# Patient Record
Sex: MALE | Race: WHITE | NOT HISPANIC OR LATINO | URBAN - METROPOLITAN AREA
[De-identification: names, ages, dates, MRNs, and addresses within clinical notes are randomized per-mention and may not be internally consistent; named-entity substitution may affect disease eponyms.]

---

## 2018-05-20 ENCOUNTER — INPATIENT (INPATIENT)
Facility: HOSPITAL | Age: 23
LOS: 0 days | Discharge: HOME | DRG: 392 | End: 2018-05-21
Attending: INTERNAL MEDICINE | Admitting: FAMILY MEDICINE
Payer: COMMERCIAL

## 2018-05-20 VITALS
TEMPERATURE: 99 F | DIASTOLIC BLOOD PRESSURE: 75 MMHG | HEART RATE: 95 BPM | OXYGEN SATURATION: 98 % | RESPIRATION RATE: 15 BRPM | WEIGHT: 229.94 LBS | SYSTOLIC BLOOD PRESSURE: 113 MMHG | HEIGHT: 72 IN

## 2018-05-20 DIAGNOSIS — D72.825 BANDEMIA: ICD-10-CM

## 2018-05-20 DIAGNOSIS — R51 HEADACHE: ICD-10-CM

## 2018-05-20 DIAGNOSIS — Z29.9 ENCOUNTER FOR PROPHYLACTIC MEASURES, UNSPECIFIED: ICD-10-CM

## 2018-05-20 DIAGNOSIS — R19.7 DIARRHEA, UNSPECIFIED: ICD-10-CM

## 2018-05-20 DIAGNOSIS — K51.911 ULCERATIVE COLITIS, UNSPECIFIED WITH RECTAL BLEEDING: ICD-10-CM

## 2018-05-20 LAB
ALBUMIN SERPL ELPH-MCNC: 3.6 G/DL — SIGNIFICANT CHANGE UP (ref 3.3–5)
ALP SERPL-CCNC: 82 U/L — SIGNIFICANT CHANGE UP (ref 40–120)
ALT FLD-CCNC: 19 U/L — SIGNIFICANT CHANGE UP (ref 12–78)
ANION GAP SERPL CALC-SCNC: 9 MMOL/L — SIGNIFICANT CHANGE UP (ref 5–17)
APPEARANCE UR: CLEAR — SIGNIFICANT CHANGE UP
APTT BLD: 28.8 SEC — SIGNIFICANT CHANGE UP (ref 27.5–37.4)
AST SERPL-CCNC: 15 U/L — SIGNIFICANT CHANGE UP (ref 15–37)
BILIRUB SERPL-MCNC: 1.4 MG/DL — HIGH (ref 0.2–1.2)
BILIRUB UR-MCNC: ABNORMAL
BUN SERPL-MCNC: 9 MG/DL — SIGNIFICANT CHANGE UP (ref 7–23)
CALCIUM SERPL-MCNC: 9 MG/DL — SIGNIFICANT CHANGE UP (ref 8.5–10.1)
CHLORIDE SERPL-SCNC: 101 MMOL/L — SIGNIFICANT CHANGE UP (ref 96–108)
CO2 SERPL-SCNC: 24 MMOL/L — SIGNIFICANT CHANGE UP (ref 22–31)
COLOR SPEC: YELLOW — SIGNIFICANT CHANGE UP
CREAT SERPL-MCNC: 0.94 MG/DL — SIGNIFICANT CHANGE UP (ref 0.5–1.3)
DIFF PNL FLD: ABNORMAL
EOSINOPHIL NFR BLD AUTO: 4 % — SIGNIFICANT CHANGE UP (ref 0–6)
GLUCOSE SERPL-MCNC: 97 MG/DL — SIGNIFICANT CHANGE UP (ref 70–99)
GLUCOSE UR QL: NEGATIVE — SIGNIFICANT CHANGE UP
HCT VFR BLD CALC: 42.5 % — SIGNIFICANT CHANGE UP (ref 39–50)
HGB BLD-MCNC: 14.7 G/DL — SIGNIFICANT CHANGE UP (ref 13–17)
INR BLD: 1.4 RATIO — HIGH (ref 0.88–1.16)
KETONES UR-MCNC: ABNORMAL
LACTATE SERPL-SCNC: 1.2 MMOL/L — SIGNIFICANT CHANGE UP (ref 0.7–2)
LEUKOCYTE ESTERASE UR-ACNC: NEGATIVE — SIGNIFICANT CHANGE UP
LIDOCAIN IGE QN: 49 U/L — LOW (ref 73–393)
LYMPHOCYTES # BLD AUTO: 32 % — SIGNIFICANT CHANGE UP (ref 13–44)
MCHC RBC-ENTMCNC: 28.4 PG — SIGNIFICANT CHANGE UP (ref 27–34)
MCHC RBC-ENTMCNC: 34.5 GM/DL — SIGNIFICANT CHANGE UP (ref 32–36)
MCV RBC AUTO: 82.2 FL — SIGNIFICANT CHANGE UP (ref 80–100)
MONOCYTES NFR BLD AUTO: 25 % — HIGH (ref 1–9)
MYELOCYTES NFR BLD: 3 % — HIGH (ref 0–0)
NEUTROPHILS NFR BLD AUTO: 12 % — LOW (ref 43–77)
NEUTS BAND # BLD: 24 % — HIGH (ref 0–8)
NITRITE UR-MCNC: NEGATIVE — SIGNIFICANT CHANGE UP
PH UR: 6 — SIGNIFICANT CHANGE UP (ref 5–8)
PLAT MORPH BLD: NORMAL — SIGNIFICANT CHANGE UP
PLATELET # BLD AUTO: 184 K/UL — SIGNIFICANT CHANGE UP (ref 150–400)
POTASSIUM SERPL-MCNC: 3.9 MMOL/L — SIGNIFICANT CHANGE UP (ref 3.5–5.3)
POTASSIUM SERPL-SCNC: 3.9 MMOL/L — SIGNIFICANT CHANGE UP (ref 3.5–5.3)
PROT SERPL-MCNC: 8.2 G/DL — SIGNIFICANT CHANGE UP (ref 6–8.3)
PROT UR-MCNC: NEGATIVE — SIGNIFICANT CHANGE UP
PROTHROM AB SERPL-ACNC: 15.4 SEC — HIGH (ref 9.8–12.7)
RBC # BLD: 5.17 M/UL — SIGNIFICANT CHANGE UP (ref 4.2–5.8)
RBC # FLD: 11.2 % — SIGNIFICANT CHANGE UP (ref 10.3–14.5)
RBC BLD AUTO: SIGNIFICANT CHANGE UP
RBC CASTS # UR COMP ASSIST: ABNORMAL /HPF (ref 0–4)
SODIUM SERPL-SCNC: 134 MMOL/L — LOW (ref 135–145)
SP GR SPEC: 1.01 — SIGNIFICANT CHANGE UP (ref 1.01–1.02)
UROBILINOGEN FLD QL: NEGATIVE — SIGNIFICANT CHANGE UP
WBC # BLD: 4.6 K/UL — SIGNIFICANT CHANGE UP (ref 3.8–10.5)
WBC # FLD AUTO: 4.6 K/UL — SIGNIFICANT CHANGE UP (ref 3.8–10.5)
WBC UR QL: SIGNIFICANT CHANGE UP

## 2018-05-20 PROCEDURE — 71046 X-RAY EXAM CHEST 2 VIEWS: CPT | Mod: 26

## 2018-05-20 PROCEDURE — 99223 1ST HOSP IP/OBS HIGH 75: CPT | Mod: AI,GC

## 2018-05-20 PROCEDURE — 74177 CT ABD & PELVIS W/CONTRAST: CPT | Mod: 26

## 2018-05-20 PROCEDURE — 99285 EMERGENCY DEPT VISIT HI MDM: CPT

## 2018-05-20 RX ORDER — IOHEXOL 300 MG/ML
30 INJECTION, SOLUTION INTRAVENOUS ONCE
Qty: 0 | Refills: 0 | Status: COMPLETED | OUTPATIENT
Start: 2018-05-20 | End: 2018-05-20

## 2018-05-20 RX ORDER — PANTOPRAZOLE SODIUM 20 MG/1
40 TABLET, DELAYED RELEASE ORAL
Qty: 0 | Refills: 0 | Status: DISCONTINUED | OUTPATIENT
Start: 2018-05-20 | End: 2018-05-21

## 2018-05-20 RX ORDER — SODIUM CHLORIDE 9 MG/ML
1000 INJECTION INTRAMUSCULAR; INTRAVENOUS; SUBCUTANEOUS
Qty: 0 | Refills: 0 | Status: DISCONTINUED | OUTPATIENT
Start: 2018-05-20 | End: 2018-05-21

## 2018-05-20 RX ORDER — SODIUM CHLORIDE 9 MG/ML
3 INJECTION INTRAMUSCULAR; INTRAVENOUS; SUBCUTANEOUS ONCE
Qty: 0 | Refills: 0 | Status: COMPLETED | OUTPATIENT
Start: 2018-05-20 | End: 2018-05-20

## 2018-05-20 RX ORDER — MESALAMINE 400 MG
400 TABLET, DELAYED RELEASE (ENTERIC COATED) ORAL
Qty: 0 | Refills: 0 | COMMUNITY

## 2018-05-20 RX ORDER — ACETAMINOPHEN 500 MG
650 TABLET ORAL EVERY 6 HOURS
Qty: 0 | Refills: 0 | Status: DISCONTINUED | OUTPATIENT
Start: 2018-05-20 | End: 2018-05-21

## 2018-05-20 RX ORDER — MESALAMINE 400 MG
800 TABLET, DELAYED RELEASE (ENTERIC COATED) ORAL EVERY 8 HOURS
Qty: 0 | Refills: 0 | Status: DISCONTINUED | OUTPATIENT
Start: 2018-05-20 | End: 2018-05-21

## 2018-05-20 RX ORDER — SODIUM CHLORIDE 9 MG/ML
1000 INJECTION INTRAMUSCULAR; INTRAVENOUS; SUBCUTANEOUS ONCE
Qty: 0 | Refills: 0 | Status: COMPLETED | OUTPATIENT
Start: 2018-05-20 | End: 2018-05-20

## 2018-05-20 RX ORDER — ONDANSETRON 8 MG/1
4 TABLET, FILM COATED ORAL EVERY 6 HOURS
Qty: 0 | Refills: 0 | Status: DISCONTINUED | OUTPATIENT
Start: 2018-05-20 | End: 2018-05-21

## 2018-05-20 RX ORDER — MESALAMINE 400 MG
0 TABLET, DELAYED RELEASE (ENTERIC COATED) ORAL
Qty: 0 | Refills: 0 | COMMUNITY

## 2018-05-20 RX ORDER — HYDROCORTISONE 20 MG
100 TABLET ORAL EVERY 8 HOURS
Qty: 0 | Refills: 0 | Status: DISCONTINUED | OUTPATIENT
Start: 2018-05-20 | End: 2018-05-21

## 2018-05-20 RX ADMIN — Medication 800 MILLIGRAM(S): at 22:18

## 2018-05-20 RX ADMIN — Medication 650 MILLIGRAM(S): at 23:43

## 2018-05-20 RX ADMIN — SODIUM CHLORIDE 3 MILLILITER(S): 9 INJECTION INTRAMUSCULAR; INTRAVENOUS; SUBCUTANEOUS at 11:35

## 2018-05-20 RX ADMIN — Medication 650 MILLIGRAM(S): at 23:14

## 2018-05-20 RX ADMIN — SODIUM CHLORIDE 1000 MILLILITER(S): 9 INJECTION INTRAMUSCULAR; INTRAVENOUS; SUBCUTANEOUS at 12:15

## 2018-05-20 RX ADMIN — SODIUM CHLORIDE 1000 MILLILITER(S): 9 INJECTION INTRAMUSCULAR; INTRAVENOUS; SUBCUTANEOUS at 11:15

## 2018-05-20 RX ADMIN — IOHEXOL 30 MILLILITER(S): 300 INJECTION, SOLUTION INTRAVENOUS at 11:42

## 2018-05-20 RX ADMIN — SODIUM CHLORIDE 1000 MILLILITER(S): 9 INJECTION INTRAMUSCULAR; INTRAVENOUS; SUBCUTANEOUS at 13:15

## 2018-05-20 RX ADMIN — Medication 650 MILLIGRAM(S): at 16:00

## 2018-05-20 RX ADMIN — Medication 650 MILLIGRAM(S): at 16:30

## 2018-05-20 RX ADMIN — Medication 100 MILLIGRAM(S): at 22:18

## 2018-05-20 RX ADMIN — SODIUM CHLORIDE 75 MILLILITER(S): 9 INJECTION INTRAMUSCULAR; INTRAVENOUS; SUBCUTANEOUS at 18:16

## 2018-05-20 NOTE — H&P ADULT - PROBLEM SELECTOR PLAN 2
repeat CBC after fluid boluses  evaluate for infectious cause of colitis  monitor for fevers, tylenol PRN fever  f/u blood cultures, stool cultures, c-diff PCR repeat CBC after fluid boluses  evaluate for infectious cause of colitis  observe off antibiotics per GI recommendations  monitor for fevers, tylenol PRN fever  f/u blood cultures, stool cultures, c-diff PCR repeat CBC after fluid boluses  evaluate for infectious cause of colitis  observe off antibiotics per GI recommendations--patient is non-toxic appearing at present. Will monitor closely.   monitor for fevers, tylenol PRN fever  f/u blood cultures, stool cultures, c-diff PCR

## 2018-05-20 NOTE — H&P ADULT - NSHPSOCIALHISTORY_GEN_ALL_CORE
Student, lives at apartment in [facility]  Denies tobacco use  Admits to social EtOH use  Denies illicit drug use    IMPROVE VTE Individual Risk Assessment          RISK                                                          Points  [  ] Previous VTE                                                3  [  ] Thrombophilia                                             2  [  ] Lower limb paralysis                                   2        (unable to hold up >15 seconds)    [  ] Current Cancer                                             2         (within 6 months)  [  ] Immobilization > 24 hrs                              1  [  ] ICU/CCU stay > 24 hours                             1  [  ] Age > 60                                                         1    IMPROVE VTE Score: 0

## 2018-05-20 NOTE — ED PROVIDER NOTE - PROGRESS NOTE DETAILS
Pt doing well, although now with pos fever. some persistent pain.   Shilo Rios, will see pt , agree with admit to med.  Shilo Ohara, accepts admit.

## 2018-05-20 NOTE — H&P ADULT - ASSESSMENT
24 y/o M with PMHx of Ulcerative Colitis (dx 1.5 years ago) admitted with Ulcerative Colitis flare, evaluate for superimposed infectious colitis.

## 2018-05-20 NOTE — CONSULT NOTE ADULT - SUBJECTIVE AND OBJECTIVE BOX
Chief Complaint:  Patient is a 23y old  Male who presents with a chief complaint of bloody diarrhea    HPI:23 year old male with diagnosis of pan Ulcerative colitis on Lialda. Since this past Thursday he has had increased diarrhea with abdominal cramps and fever to 102 F . There has been no recent travel or antibiotic use. His symptoms had increased a while back and Lialda was increased to 4.8 grams. He started tapering the Lialda and then developed worsening symptoms. He had nausea and vomiting with dry heaves and a streak of blood a couple of days ago.    Allergies     No Known Allergies    Intolerances        MEDICATIONS  (STANDING):  hydrocortisone sodium succinate Injectable 100 milliGRAM(s) IV Push every 8 hours  mesalamine DR Capsule 800 milliGRAM(s) Oral every 8 hours    MEDICATIONS  (PRN):      PAST MEDICAL & SURGICAL HISTORY:  Ulcerative pancolitis  No significant past surgical history      FAMILY HISTORY: Non Contributory      Social History  Tobacco:no  Alcohol: social  Drugs: no    ROS  General:  No wt loss,  chills, night sweats, fatigue, positive fever  Eyes:  Good vision, no reported pain  ENT:  No sore throat, pain, runny nose, dysphagia  CV:  No pain, palpitations, hypo/hypertension  Resp:  No dyspnea, cough, tachypnea, wheezing  GI:  No pain, No nausea, No vomiting, No diarrhea, No constipation, No weight loss, No fever, No pruritis, No rectal bleeding, No tarry stools, No dysphagia,  :  No pain, bleeding, incontinence, nocturia  Muscle:  No pain, weakness vague joint pains  Neuro:  No weakness, tingling, memory problems headache associated with fever.  Psych:  No fatigue, insomnia, mood problems, depression  Endocrine:  No polyuria, polydipsia, cold/heat intolerance  Heme:  No petechiae, ecchymosis, easy bruisability  Skin:  No rash, tattoos, scars, edema      PHYSICAL EXAM:   Vital Signs Last 24 Hrs  T(C): 37.9 (20 May 2018 13:20), Max: 37.9 (20 May 2018 13:20)  T(F): 100.3 (20 May 2018 13:20), Max: 100.3 (20 May 2018 13:20)  HR: 95 (20 May 2018 10:39) (95 - 95)  BP: 113/75 (20 May 2018 10:39) (113/75 - 113/75)  BP(mean): --  RR: 15 (20 May 2018 10:39) (15 - 15)  SpO2: 98% (20 May 2018 10:39) (98% - 98%)  GENERAL:  Well developed, well-nourished  HEENT:  NC/AT,  conjunctivae clear and pink, no thyromegaly, nodules, adenopathy, no JVD, sclera -anicteric  CHEST: Lungs clear to P&A  ABDOMEN:  Soft, mild diffuse tenderness and plus minus rebound , non-distended, diminished bowel sounds,  no masses ,no hepato-splenomegaly, no signs of chronic liver disease  EXTEREMITIES:  no cyanosis,clubbing or edema  SKIN:  No rash/erythema/ecchymoses/petechiae/wounds/abscess/warm/dry  NEURO:  Alert, oriented  Height (cm): 182.88 ( @ 10:39)  Weight (kg): 104.3 ( @ 10:39)  BMI (kg/m2): 31.2 ( @ 10:39)  BSA (m2): 2.26 ( @ 10:39)    LABS:                        14.7   4.6   )-----------( 184      ( 20 May 2018 11:33 )             42.5     05-20    134<L>  |  101  |  9   ----------------------------<  97  3.9   |  24  |  0.94    Ca    9.0      20 May 2018 11:33    TPro  8.2  /  Alb  3.6  /  TBili  1.4<H>  /  DBili  x   /  AST  15  /  ALT  19  /  AlkPhos  82  05-20     LIVER FUNCTIONS - ( 20 May 2018 11:33 )  Alb: 3.6 g/dL / Pro: 8.2 g/dL / ALK PHOS: 82 U/L / ALT: 19 U/L / AST: 15 U/L / GGT: x           PT/INR - ( 20 May 2018 11:33 )   PT: 15.4 sec;   INR: 1.40 ratio         PTT - ( 20 May 2018 11:33 )  PTT:28.8 sec  Urinalysis Basic - ( 20 May 2018 13:47 )    Color: Yellow / Appearance: Clear / S.015 / pH: x  Gluc: x / Ketone: Small  / Bili: Small / Urobili: Negative   Blood: x / Protein: Negative / Nitrite: Negative   Leuk Esterase: Negative / RBC: 3-5 /HPF / WBC 0-2   Sq Epi: x / Non Sq Epi: x / Bacteria: x      Amylase Serum--      Lipase serum49       Ammonia--      Imaging:  < from: CT Abdomen and Pelvis w/ Oral Cont and w/ IV Cont (18 @ 14:00) >  EXAM:  CT ABDOMEN AND PELVIS OC IC                            PROCEDURE DATE:  2018          INTERPRETATION:  CT ABDOMEN AND PELVIS    CLINICAL INFORMATION:  Lower abdominal pain with some bloody diarrhea.   History of ulcerative colitis.    PROCEDURE:  Using multislice helical CT, oral contrast, and following the   intravenous administration of 95 cc Omnipaque 300 , 2.5 mm sections were   obtained from the domes of the diaphragm to the ischial tuberosities.    Multiplanar MPR's were performed.    COMPARISON: None available    FINDINGS:      The liver, spleen and gallbladder appear unremarkable.    The adrenal glands and pancreas are unremarkable in appearance.    No hydroureteronephrosis is noted.    The abdominal aorta is normal in caliber.  There are small periaortic retroperitoneal lymph nodes.    Evaluation of the stomach is limited without distention.  There is diffuse submucosal edema/bowel wall thickening involving the   cecum and ascending colon, through the hepatic flexure. Bowel wall   thickening/submucosal edema is also noted involving the terminal ileum,   which may be associated with a backwash ileitis in patient with history   of ulcerative colitis.  The appendix is normal in appearance.    There are clusters ofmesenteric and right ileocolic lymph nodes, largest   measuring up to 2 cm.    Although nondistended, there is also bowel wall thickening involving the   descending through proximal sigmoid colon.    No bowel obstruction is noted.  No localized intra-abdominal fluid collection or pneumoperitoneum is   noted.    The urinary bladder appears unremarkable.  No pelvic mass or free fluid is noted.    Impression:    Noncontiguous areas of bowel wall thickening, more pronounced in the   right colon as discussed, compatible with inflammatory colitis; and may   be associated with ulcerative colitis or Crohn's disease.  Involvement of the terminal ileum as discussed may be associated with a   backwash ileitis.    Enlarged mesenteric and right ileocolic lymph nodes, which may be   reactive.  Recommend comparison with any prior imaging studies.      IBETH SIDDIQUI M.D., ATTENDING RADIOLOGIST  This document has been electronically signed. May 20 2018  2:14PM        < end of copied text >

## 2018-05-20 NOTE — H&P ADULT - PROBLEM SELECTOR PLAN 1
admit to medicine  start solu-cortef 100mg q8 and mesalamine 800mg q8  start on clear diet, add IVF at 75cc while PO intake is limited  discussed case with GI consult Dr. Rios. Dr. Tucker to see patient tomorrow. admit to medicine  start solu-cortef 100mg q8 and mesalamine 800mg q8  start on clear diet, add IVF at 75cc while PO intake is limited; zofran PRN  discussed case with GI consult Dr. Rios. Dr. Tucker to see patient tomorrow.

## 2018-05-20 NOTE — ED ADULT TRIAGE NOTE - CHIEF COMPLAINT QUOTE
abdominal pain with bloody diarrhea for a couple of days. abdominal pain with bloody diarrhea for a couple of days.with fever

## 2018-05-20 NOTE — ED ADULT NURSE NOTE - OBJECTIVE STATEMENT
Pt presents to the ED c/o intermittent fever, left sided abd pain radiating to the middle of his abd, denies nausea, vomiting, diarrhea. Assumed care od pt from Alicia Ang in rm 13 B, c/o intermittent fever, left sided abd pain radiating to the middle of his abd, denies nausea, vomiting, diarrhea. Assumed care od pt from Alicia Ang in rm 13 B, c/o intermittent fever, left sided abd pain radiating to the middle of his abd, diarrhea, denies nausea, vomiting.

## 2018-05-20 NOTE — H&P ADULT - ATTENDING COMMENTS
Case discussed with PGY2 Dr. Hinojosa. I agree with his H&P, assessment, and plan above (edited where necessary).

## 2018-05-20 NOTE — ED PROVIDER NOTE - CHPI ED SYMPTOMS NEG
no chills/no blood in stool/no dysuria/no fever/no abdominal distension/no burning urination/no hematuria

## 2018-05-20 NOTE — H&P ADULT - HISTORY OF PRESENT ILLNESS
22 y/o M with PMHx of Ulcerative Colitis (dx 1.5 years ago) presented to the ED with a 4 day history of crampy abdominal pain and diarrhea. States that diarrhea has been going on since Thursday and is occasionally bloody. States that this feels like a flare of his UC. Pain is diffuse and comes and goes, now L>R. Patient also admits to dry heaving with some vomiting with scant blood. Also has been having fevers for last 4 days, taking 2 Advil a day. Tmax 101 today. Sees Dr. Tucker for GI, had colonoscopy one month ago and patient states that he was told that it revealed some mild inflammation. Admits to chills and headache as well.    In the ED, VSS Tmax 100.3. Labs significant for bandemia of 24%. CXR negative. CT Abdominal/Pelvis showed Noncontiguous areas of bowel wall thickening, more pronounced in the right colon as discussed, compatible with inflammatory colitis; and may be associated with ulcerative colitis or Crohn's disease. Involvement of the terminal ileum as discussed may be associated with a backwash ileitis. Enlarged mesenteric and right ileocolic lymph nodes, which may be reactive.

## 2018-05-20 NOTE — H&P ADULT - NSHPPHYSICALEXAM_GEN_ALL_CORE
Vital Signs Last 24 Hrs  T(C): 37.9 (20 May 2018 13:20), Max: 37.9 (20 May 2018 13:20)  T(F): 100.3 (20 May 2018 13:20), Max: 100.3 (20 May 2018 13:20)  HR: 95 (20 May 2018 10:39) (95 - 95)  BP: 113/75 (20 May 2018 10:39) (113/75 - 113/75)  RR: 15 (20 May 2018 10:39) (15 - 15)  SpO2: 98% (20 May 2018 10:39) (98% - 98%)    Physical Exam:  General: Well developed, well nourished, NAD  HEENT: NCAT, PERRLA, EOMI bl, moist mucous membranes   Neck: Supple, nontender, no mass  Neurology: A&Ox3, nonfocal, CN II-XII grossly intact, sensation intact  Respiratory: CTA B/L, No W/R/R  CV: RRR, +S1/S2, no murmurs, rubs or gallops  Abdominal: Soft, diffuse tenderness to palpation with some rebound L>R, non distended, normoactive bowel sounds  Extremities: No C/C/E, + peripheral pulses  MSK: Normal ROM, no joint erythema or warmth, no joint swelling   Skin: warm, dry, normal color, no rash or abnormal lesions

## 2018-05-20 NOTE — ED PROVIDER NOTE - OBJECTIVE STATEMENT
22 yo M p/w lower abd pain x past ~ 4 days. No cp, sob, palp. Some nausea, no vomiting. Some loose watery diarrhea with on off bloody diarrhea. Pt with hx UC, feels ? exacerbation. Pt sent by GI for hood / CT. No neck / back pain. no numb/ting/focal weak. No agg/allev factors. No other inj or co.

## 2018-05-21 VITALS
HEART RATE: 82 BPM | OXYGEN SATURATION: 99 % | DIASTOLIC BLOOD PRESSURE: 77 MMHG | RESPIRATION RATE: 18 BRPM | TEMPERATURE: 98 F | SYSTOLIC BLOOD PRESSURE: 129 MMHG

## 2018-05-21 LAB
ANION GAP SERPL CALC-SCNC: 9 MMOL/L — SIGNIFICANT CHANGE UP (ref 5–17)
BUN SERPL-MCNC: 8 MG/DL — SIGNIFICANT CHANGE UP (ref 7–23)
C DIFF BY PCR RESULT: SIGNIFICANT CHANGE UP
C DIFF TOX GENS STL QL NAA+PROBE: SIGNIFICANT CHANGE UP
CALCIUM SERPL-MCNC: 8.5 MG/DL — SIGNIFICANT CHANGE UP (ref 8.5–10.1)
CHLORIDE SERPL-SCNC: 102 MMOL/L — SIGNIFICANT CHANGE UP (ref 96–108)
CO2 SERPL-SCNC: 26 MMOL/L — SIGNIFICANT CHANGE UP (ref 22–31)
CREAT SERPL-MCNC: 0.75 MG/DL — SIGNIFICANT CHANGE UP (ref 0.5–1.3)
CULTURE RESULTS: NO GROWTH — SIGNIFICANT CHANGE UP
EOSINOPHIL NFR BLD AUTO: 1 % — SIGNIFICANT CHANGE UP (ref 0–6)
GLUCOSE SERPL-MCNC: 104 MG/DL — HIGH (ref 70–99)
HCT VFR BLD CALC: 35 % — LOW (ref 39–50)
HGB BLD-MCNC: 12.5 G/DL — LOW (ref 13–17)
LYMPHOCYTES # BLD AUTO: 15 % — SIGNIFICANT CHANGE UP (ref 13–44)
MCHC RBC-ENTMCNC: 29.1 PG — SIGNIFICANT CHANGE UP (ref 27–34)
MCHC RBC-ENTMCNC: 35.7 GM/DL — SIGNIFICANT CHANGE UP (ref 32–36)
MCV RBC AUTO: 81.6 FL — SIGNIFICANT CHANGE UP (ref 80–100)
MONOCYTES NFR BLD AUTO: 23 % — HIGH (ref 1–9)
NEUTROPHILS NFR BLD AUTO: 19 % — LOW (ref 43–77)
PLATELET # BLD AUTO: 163 K/UL — SIGNIFICANT CHANGE UP (ref 150–400)
POTASSIUM SERPL-MCNC: 3.8 MMOL/L — SIGNIFICANT CHANGE UP (ref 3.5–5.3)
POTASSIUM SERPL-SCNC: 3.8 MMOL/L — SIGNIFICANT CHANGE UP (ref 3.5–5.3)
RBC # BLD: 4.29 M/UL — SIGNIFICANT CHANGE UP (ref 4.2–5.8)
RBC # FLD: 11 % — SIGNIFICANT CHANGE UP (ref 10.3–14.5)
SODIUM SERPL-SCNC: 137 MMOL/L — SIGNIFICANT CHANGE UP (ref 135–145)
SPECIMEN SOURCE: SIGNIFICANT CHANGE UP
WBC # BLD: 4 K/UL — SIGNIFICANT CHANGE UP (ref 3.8–10.5)
WBC # FLD AUTO: 4 K/UL — SIGNIFICANT CHANGE UP (ref 3.8–10.5)

## 2018-05-21 PROCEDURE — 83605 ASSAY OF LACTIC ACID: CPT

## 2018-05-21 PROCEDURE — 71046 X-RAY EXAM CHEST 2 VIEWS: CPT

## 2018-05-21 PROCEDURE — 99285 EMERGENCY DEPT VISIT HI MDM: CPT | Mod: 25

## 2018-05-21 PROCEDURE — 36415 COLL VENOUS BLD VENIPUNCTURE: CPT

## 2018-05-21 PROCEDURE — 80048 BASIC METABOLIC PNL TOTAL CA: CPT

## 2018-05-21 PROCEDURE — 87045 FECES CULTURE AEROBIC BACT: CPT

## 2018-05-21 PROCEDURE — 83690 ASSAY OF LIPASE: CPT

## 2018-05-21 PROCEDURE — 80053 COMPREHEN METABOLIC PANEL: CPT

## 2018-05-21 PROCEDURE — 74177 CT ABD & PELVIS W/CONTRAST: CPT

## 2018-05-21 PROCEDURE — 85730 THROMBOPLASTIN TIME PARTIAL: CPT

## 2018-05-21 PROCEDURE — 87086 URINE CULTURE/COLONY COUNT: CPT

## 2018-05-21 PROCEDURE — 87046 STOOL CULTR AEROBIC BACT EA: CPT

## 2018-05-21 PROCEDURE — 87040 BLOOD CULTURE FOR BACTERIA: CPT

## 2018-05-21 PROCEDURE — 85610 PROTHROMBIN TIME: CPT

## 2018-05-21 PROCEDURE — 81001 URINALYSIS AUTO W/SCOPE: CPT

## 2018-05-21 PROCEDURE — 85027 COMPLETE CBC AUTOMATED: CPT

## 2018-05-21 PROCEDURE — 87493 C DIFF AMPLIFIED PROBE: CPT

## 2018-05-21 PROCEDURE — 99239 HOSP IP/OBS DSCHRG MGMT >30: CPT

## 2018-05-21 RX ADMIN — PANTOPRAZOLE SODIUM 40 MILLIGRAM(S): 20 TABLET, DELAYED RELEASE ORAL at 05:50

## 2018-05-21 RX ADMIN — Medication 100 MILLIGRAM(S): at 12:53

## 2018-05-21 RX ADMIN — Medication 800 MILLIGRAM(S): at 05:50

## 2018-05-21 RX ADMIN — Medication 100 MILLIGRAM(S): at 05:50

## 2018-05-21 RX ADMIN — Medication 800 MILLIGRAM(S): at 12:53

## 2018-05-21 NOTE — DISCHARGE NOTE ADULT - CARE PLAN
Principal Discharge DX:	Gastroenteritis, infectious  Goal:	Resolution Principal Discharge DX:	Gastroenteritis, infectious  Goal:	Resolution  Assessment and plan of treatment:	-You likely had a viral gastrointestinal infection which caused your symptoms over the last few days.  -You were seen by your Gastroenterologist Dr. Tucker, who agreed that this episode was likely not a flare of your Ulcerative Colitis.  -You were started on intravenous steroids while in the hospital and will continue taking oral steroids as above until your follow up with Dr. Tucker outpatient on Friday.  -Continue to advance your diet as much as you can tolerate.

## 2018-05-21 NOTE — DISCHARGE NOTE ADULT - PLAN OF CARE
Resolution -You likely had a viral gastrointestinal infection which caused your symptoms over the last few days.  -You were seen by your Gastroenterologist Dr. Tucker, who agreed that this episode was likely not a flare of your Ulcerative Colitis.  -You were started on intravenous steroids while in the hospital and will continue taking oral steroids as above until your follow up with Dr. Tucker outpatient on Friday.  -Continue to advance your diet as much as you can tolerate.

## 2018-05-21 NOTE — DISCHARGE NOTE ADULT - HOSPITAL COURSE
22 y/o M with PMHx of Ulcerative Colitis (dx 1.5 years ago) presented to the ED with a 4 day history of crampy abdominal pain and diarrhea. Stated that diarrhea has been going on since Thursday 5/17/18 and is occasionally bloody. Stated that this feels like a flare of his UC. Pain is diffuse and comes and goes, now L>R. Patient also admitted to dry heaving with some vomiting with scant blood. Also has been having fevers for last 4 days, taking 2 Advil a day. Tmax 101 today. Sees Dr. Tucker for GI, had colonoscopy one month ago and patient stated that he was told that it revealed some mild inflammation. Admitted to chills and headache as well.    In the ED, VSS Tmax 100.3. Labs significant for bandemia of 24%. CT Abdominal/Pelvis showed noncontiguous areas of bowel wall thickening, more pronounced in the right colon as discussed, compatible with inflammatory colitis; and may be associated with ulcerative colitis or Crohn's disease. Involvement of the terminal ileum as discussed may be associated with a backwash ileitis. Enlarged mesenteric and right ileocolic lymph nodes, which may be reactive.    Patient was [facility] to [facility] for probable Ulcerative Colitis flare. Dr. Tucker (GI) consulted and stated that this episode was more likely due to infectious gastroenteritis given rapid improvement. He was started on Hydrocortisone IV and was transitioned to Prednisone PO. Diet was advanced and tolerated, and patient continued to improve clinically.    Patient was medically optimized and improved clinically throughout hospital course. Patient seen and examined on day of discharge.    Vital Signs Last 24 Hrs  T(C): 36.4 (21 May 2018 07:53), Max: 37.9 (20 May 2018 13:20)  T(F): 97.5 (21 May 2018 07:53), Max: 100.3 (20 May 2018 13:20)  HR: 70 (21 May 2018 07:53) (70 - 107)  BP: 113/72 (21 May 2018 07:53) (104/68 - 128/77)  RR: 18 (21 May 2018 07:53) (16 - 18)  SpO2: 98% (21 May 2018 07:53) (97% - 98%)    Physical Exam:  General: Well developed, well nourished, NAD  HEENT: Moist mucous membranes   Neurology: A&Ox3, nonfocal  Respiratory: CTA B/L, No W/R/R  CV: RRR, +S1/S2, no murmurs  Abdominal: Soft, NT, ND +BSx4  Extremities: No LE edema bilaterally, + peripheral pulses  MSK: Normal ROM, no joint erythema or warmth, no joint swelling   Skin: warm, dry, normal color, no rash or abnormal lesions    Patient is medically stable for discharge to home with outpatient GI follow up.

## 2018-05-21 NOTE — DISCHARGE NOTE ADULT - PATIENT PORTAL LINK FT
You can access the [facility] Patient Portal, offered by [facility], by registering with the following website: [email or web address]

## 2018-05-21 NOTE — DISCHARGE NOTE ADULT - ADDITIONAL INSTRUCTIONS
-Please follow up with your primary doctor within one week.  -Please follow up with GI outpatient (information below) within one week of discharge.  -Patient and family to set up follow up appointments.  -Continue taking your medications as directed above.  -If symptoms persist/worsen, please call your PMD or return to the ED.

## 2018-05-21 NOTE — DISCHARGE NOTE ADULT - CARE PROVIDER_API CALL
Ulises Tucker), Gastroenterology; Internal Medicine  [address] [place] [address]  Phone: [phone]  Fax: [phone]

## 2018-05-21 NOTE — DISCHARGE NOTE ADULT - NS AS ACTIVITY OBS
Showering allowed/Walking-Indoors allowed/Return to Work/School allowed/Walking-Outdoors allowed/Stairs allowed

## 2018-05-21 NOTE — DISCHARGE NOTE ADULT - MEDICATION SUMMARY - MEDICATIONS TO TAKE
I will START or STAY ON the medications listed below when I get home from the hospital:    mesalamine  -- 400 milligram(s) by mouth 4 times a day  -- Indication: For Ulcerative pancolitis    predniSONE 10 mg oral tablet  -- 1 tab(s) by mouth 2 times a day   -- It is very important that you take or use this exactly as directed.  Do not skip doses or discontinue unless directed by your doctor.  Obtain medical advice before taking any non-prescription drugs as some may affect the action of this medication.  Take with food or milk.    -- Indication: For Ulcerative pancolitis

## 2018-05-21 NOTE — PROGRESS NOTE ADULT - SUBJECTIVE AND OBJECTIVE BOX
Chief Complaint:  Patient is a 23y old  Male who presents with a chief complaint of abdominal pain (20 May 2018 15:24)      Intertim history: pt feels much better.  No BM overnight.  no cramps or nausea.  No emesis  Allergies    No Known Allergies    Intolerances        MEDICATIONS  (STANDING):  hydrocortisone sodium succinate Injectable 100 milliGRAM(s) IV Push every 8 hours  mesalamine DR Capsule 800 milliGRAM(s) Oral every 8 hours  pantoprazole    Tablet 40 milliGRAM(s) Oral before breakfast  sodium chloride 0.9%. 1000 milliLiter(s) (75 mL/Hr) IV Continuous <Continuous>    MEDICATIONS  (PRN):  acetaminophen   Tablet 650 milliGRAM(s) Oral every 6 hours PRN For Temp greater than 38.5 C (101.3 F)  acetaminophen   Tablet. 650 milliGRAM(s) Oral every 6 hours PRN Mild Pain (1 - 3)  ondansetron Injectable 4 milliGRAM(s) IV Push every 6 hours PRN Nausea and/or Vomiting      PAST MEDICAL & SURGICAL HISTORY:  Ulcerative pancolitis  No significant past surgical history        PHYSICAL EXAM:   Vital Signs Last 24 Hrs  T(C): 36.4 (21 May 2018 07:53), Max: 37.9 (20 May 2018 13:20)  T(F): 97.5 (21 May 2018 07:53), Max: 100.3 (20 May 2018 13:20)  HR: 70 (21 May 2018 07:53) (70 - 107)  BP: 113/72 (21 May 2018 07:53) (104/68 - 128/77)  BP(mean): --  RR: 18 (21 May 2018 07:53) (15 - 18)  SpO2: 98% (21 May 2018 07:53) (97% - 98%)  GENERAL:  Well developed, well-nourished  HEENT:  NC/AT,  conjunctivae clear and pink, no thyromegaly, nodules, adenopathy, no JVD, sclera -anicteric  CHEST: Lungs clear to P&A  ABDOMEN:  Soft, non-tender , slightly distended, normoactive bowel sounds,  no masses ,no hepato-splenomegaly, no signs of chronic liver disease  EXTEREMITIES:  no cyanosis,clubbing or edema  SKIN:  No rash/erythema/ecchymoses/petechiae/wounds/abscess/warm/dry  NEURO:  Alert, oriented  Height (cm): 182.88 ( @ 10:39)  Weight (kg): 104.3 ( @ 10:39)  BMI (kg/m2): 31.2 ( @ 10:39)  BSA (m2): 2.26 ( @ 10:39)    LABS:                        14.7   4.6   )-----------( 184      ( 20 May 2018 11:33 )             42.5         137  |  102  |  8   ----------------------------<  104<H>  3.8   |  26  |  0.75    Ca    8.5      21 May 2018 06:33    TPro  8.2  /  Alb  3.6  /  TBili  1.4<H>  /  DBili  x   /  AST  15  /  ALT  19  /  AlkPhos  82  20     LIVER FUNCTIONS - ( 20 May 2018 11:33 )  Alb: 3.6 g/dL / Pro: 8.2 g/dL / ALK PHOS: 82 U/L / ALT: 19 U/L / AST: 15 U/L / GGT: x           PT/INR - ( 20 May 2018 11:33 )   PT: 15.4 sec;   INR: 1.40 ratio         PTT - ( 20 May 2018 11:33 )  PTT:28.8 sec  Urinalysis Basic - ( 20 May 2018 13:47 )    Color: Yellow / Appearance: Clear / S.015 / pH: x  Gluc: x / Ketone: Small  / Bili: Small / Urobili: Negative   Blood: x / Protein: Negative / Nitrite: Negative   Leuk Esterase: Negative / RBC: 3-5 /HPF / WBC 0-2   Sq Epi: x / Non Sq Epi: x / Bacteria: x      Amylase Serum--      Lipase serum49       Ammonia--      Imaging:      Impression and plan: Patient much improved, likely infectious gastroenteritis rather than IBD.  OK to discharge this afternoon if patient still feeling well and tolerating fluids.  Will f/u in office on Friday.

## 2018-05-23 LAB
CULTURE RESULTS: SIGNIFICANT CHANGE UP
SPECIMEN SOURCE: SIGNIFICANT CHANGE UP

## 2018-05-25 LAB
CULTURE RESULTS: SIGNIFICANT CHANGE UP
CULTURE RESULTS: SIGNIFICANT CHANGE UP
SPECIMEN SOURCE: SIGNIFICANT CHANGE UP
SPECIMEN SOURCE: SIGNIFICANT CHANGE UP

## 2024-01-22 NOTE — CONSULT NOTE ADULT - ASSESSMENT
Patient needs to be called and told that her TB test came back positive and we need to do a repeat Quantiferon TB test and a chest Xray. It is common to have a false positive. She was asymptomatic and took the test just for her employer. I will put in the order and she will need to schedule with lab and X-ray.     Thanks,  Gayatri SAUNDERS., CNP Excaberation of Ulcerative colitis with associated fever and bandemia. Plan blood and stool cultures. Stool for C difficile. Continue mesalamine and start IV steroids. Discussed with patient and his father.

## 2024-02-26 NOTE — ED PROVIDER NOTE - NEUROLOGICAL, MLM
66 yo with head trauma this AM, neuro intact, well appearing. Will obtain CT Head, reassess. FS wnl.
Alert and oriented, no focal deficits, no motor or sensory deficits.